# Patient Record
Sex: FEMALE | Race: WHITE | NOT HISPANIC OR LATINO | URBAN - METROPOLITAN AREA
[De-identification: names, ages, dates, MRNs, and addresses within clinical notes are randomized per-mention and may not be internally consistent; named-entity substitution may affect disease eponyms.]

---

## 2017-01-03 ENCOUNTER — APPOINTMENT (RX ONLY)
Dept: URBAN - METROPOLITAN AREA CLINIC 22 | Facility: CLINIC | Age: 52
Setting detail: DERMATOLOGY
End: 2017-01-03

## 2017-01-03 DIAGNOSIS — Z42.8 ENCOUNTER FOR OTHER PLASTIC AND RECONSTRUCTIVE SURGERY FOLLOWING MEDICAL PROCEDURE OR HEALED INJURY: ICD-10-CM

## 2017-01-03 PROCEDURE — ? COUNSELING - POST-OP CHECK, IMPLANT EXCHANGE WITH MASTOPEXY

## 2017-01-03 PROCEDURE — 99024 POSTOP FOLLOW-UP VISIT: CPT

## 2017-01-03 ASSESSMENT — LOCATION SIMPLE DESCRIPTION DERM
LOCATION SIMPLE: LEFT BREAST
LOCATION SIMPLE: RIGHT BREAST

## 2017-01-03 ASSESSMENT — LOCATION ZONE DERM: LOCATION ZONE: TRUNK

## 2017-01-03 ASSESSMENT — LOCATION DETAILED DESCRIPTION DERM
LOCATION DETAILED: LEFT MEDIAL BREAST 8-9:00 REGION
LOCATION DETAILED: RIGHT MEDIAL BREAST 5-6:00 REGION

## 2017-01-03 NOTE — HPI: POST-OP CHECK, COSMETIC (EXTENDED)
What Best Describes The Level Of Symmetry? (Check All That Apply): good
How Severe Is Your Pain?: mild
How Is Your Wound Healing?: healing well
Compared To The Last Evaluation, How Have The Findings Changed?: improved
Date Of Procedure: 10/31/2016

## 2017-01-25 ENCOUNTER — RX ONLY (OUTPATIENT)
Age: 52
Setting detail: RX ONLY
End: 2017-01-25

## 2017-01-25 RX ORDER — OXYCODONE HYDROCHLORIDE AND ACETAMINOPHEN 5; 325 MG/1; MG/1
TABLET ORAL
Qty: 40 | Refills: 0

## 2017-01-30 ENCOUNTER — APPOINTMENT (RX ONLY)
Dept: URBAN - METROPOLITAN AREA CLINIC 22 | Facility: CLINIC | Age: 52
Setting detail: DERMATOLOGY
End: 2017-01-30

## 2017-01-30 DIAGNOSIS — H02.83 DERMATOCHALASIS OF EYELID: ICD-10-CM

## 2017-01-30 PROBLEM — H02.834 DERMATOCHALASIS OF LEFT UPPER EYELID: Status: ACTIVE | Noted: 2017-01-30

## 2017-01-30 PROBLEM — H02.831 DERMATOCHALASIS OF RIGHT UPPER EYELID: Status: ACTIVE | Noted: 2017-01-30

## 2017-01-30 PROCEDURE — ? PRESCRIPTION

## 2017-01-30 PROCEDURE — 99211 OFF/OP EST MAY X REQ PHY/QHP: CPT

## 2017-01-30 PROCEDURE — ? PRE-OP WORKLIST

## 2017-01-30 RX ORDER — CEPHALEXIN 500 MG/1
CAPSULE ORAL
Qty: 28 | Refills: 0 | Status: ERX

## 2017-01-30 RX ORDER — ONDANSETRON HYDROCHLORIDE 4 MG/1
TABLET, ORALLY DISINTEGRATING ORAL
Qty: 15 | Refills: 0 | Status: ERX

## 2017-01-30 ASSESSMENT — LOCATION ZONE DERM: LOCATION ZONE: EYELID

## 2017-01-30 ASSESSMENT — LOCATION DETAILED DESCRIPTION DERM
LOCATION DETAILED: LEFT LATERAL SUPERIOR EYELID
LOCATION DETAILED: RIGHT SUPRATARSAL CREASE

## 2017-01-30 ASSESSMENT — LOCATION SIMPLE DESCRIPTION DERM
LOCATION SIMPLE: RIGHT SUPERIOR EYELID
LOCATION SIMPLE: LEFT SUPERIOR EYELID

## 2017-01-30 NOTE — HPI: PREOPERATIVE APPOINTMENT (BRIEF)
Has The Patient Completed Informed Consent?: is scheduled to complete informed consent documentation during this visit
Have Arrangements And Instructions Been Made For Patient Transportation?: Transportation arrangements have been made
Date Of Procedure?: 02/06/2017
Facility: [facility]
Surgeon: Dr. Byers

## 2017-01-30 NOTE — PROCEDURE: PRE-OP WORKLIST
Surgeon: Dr. Byers
Photos Taken?: yes
Date Of Surgery: 02/06/2017
Surgery Scheduled: bilateral upper blepharoplasty (insurance)
Detail Level: Simple
Admission Status: outpatient

## 2017-02-06 ENCOUNTER — APPOINTMENT (RX ONLY)
Dept: URBAN - METROPOLITAN AREA SURGERY CENTER 5 | Facility: SURGERY CENTER | Age: 52
Setting detail: DERMATOLOGY
End: 2017-02-06

## 2017-02-06 DIAGNOSIS — H02.83 DERMATOCHALASIS OF EYELID: ICD-10-CM

## 2017-02-06 PROBLEM — H02.831 DERMATOCHALASIS OF RIGHT UPPER EYELID: Status: ACTIVE | Noted: 2017-02-06

## 2017-02-06 PROBLEM — H02.834 DERMATOCHALASIS OF LEFT UPPER EYELID: Status: ACTIVE | Noted: 2017-02-06

## 2017-02-06 PROCEDURE — ? SET GLOBAL PERIOD

## 2017-02-06 PROCEDURE — 15823 BLEPHARP UPR EYELID XCSV SKN: CPT | Mod: 50

## 2017-02-06 ASSESSMENT — LOCATION SIMPLE DESCRIPTION DERM
LOCATION SIMPLE: RIGHT SUPERIOR EYELID
LOCATION SIMPLE: RIGHT SUPERIOR EYELID
LOCATION SIMPLE: LEFT SUPERIOR EYELID
LOCATION SIMPLE: LEFT SUPERIOR EYELID

## 2017-02-06 ASSESSMENT — LOCATION DETAILED DESCRIPTION DERM
LOCATION DETAILED: RIGHT LATERAL SUPERIOR EYELID
LOCATION DETAILED: LEFT LATERAL SUPERIOR EYELID
LOCATION DETAILED: LEFT LATERAL SUPERIOR EYELID
LOCATION DETAILED: RIGHT SUPRATARSAL CREASE

## 2017-02-06 ASSESSMENT — LOCATION ZONE DERM
LOCATION ZONE: EYELID
LOCATION ZONE: EYELID

## 2017-02-06 NOTE — PROCEDURE: SET GLOBAL PERIOD
Date Of Surgery (Mm/Dd/Yyyy): 02/06/2017
Detail Level: Detailed
Other Surgery Performed: upperlid blepharoplasty
Surgery Global Period: 90

## 2017-02-13 ENCOUNTER — APPOINTMENT (RX ONLY)
Dept: URBAN - METROPOLITAN AREA CLINIC 22 | Facility: CLINIC | Age: 52
Setting detail: DERMATOLOGY
End: 2017-02-13

## 2017-02-13 DIAGNOSIS — Z48.89 ENCOUNTER FOR OTHER SPECIFIED SURGICAL AFTERCARE: ICD-10-CM

## 2017-02-13 PROCEDURE — ? PATIENT SPECIFIC COUNSELING

## 2017-02-13 PROCEDURE — 99024 POSTOP FOLLOW-UP VISIT: CPT

## 2017-02-13 PROCEDURE — ? SUTURE REMOVAL

## 2017-02-13 PROCEDURE — ? COUNSELING - POST-OP CHECK, BLEPHAROPLASTY

## 2017-02-13 ASSESSMENT — LOCATION SIMPLE DESCRIPTION DERM
LOCATION SIMPLE: LEFT SUPERIOR EYELID
LOCATION SIMPLE: RIGHT SUPERIOR EYELID

## 2017-02-13 ASSESSMENT — LOCATION DETAILED DESCRIPTION DERM
LOCATION DETAILED: LEFT LATERAL SUPERIOR EYELID
LOCATION DETAILED: RIGHT LATERAL SUPERIOR EYELID

## 2017-02-13 ASSESSMENT — LOCATION ZONE DERM: LOCATION ZONE: EYELID

## 2017-02-13 NOTE — PROCEDURE: PATIENT SPECIFIC COUNSELING
Other (Free Text): Physician counseled the patient to wait one more week before resuming normal workout regimen.
Detail Level: Simple

## 2017-02-13 NOTE — PROCEDURE: REASSURANCE
Information Provided: Reassurance was provided to the patient that the clinical findings are within expected normal limits and do not require specific further evaluation or treatment at this time.
Follow-Up Increment: 1
Follow-Up Interval: month
Follow-Up: no
Detail Level: Simple

## 2017-02-13 NOTE — HPI: POST-OP CHECK (EXTENDED)
How Severe Is Your Pain?: 0 out of 10
How Is Your Wound Healing?: healing well
Date Of Procedure: 2/6/2017

## 2017-05-03 ENCOUNTER — APPOINTMENT (RX ONLY)
Dept: URBAN - METROPOLITAN AREA CLINIC 22 | Facility: CLINIC | Age: 52
Setting detail: DERMATOLOGY
End: 2017-05-03

## 2017-05-03 DIAGNOSIS — Z41.1 ENCOUNTER FOR COSMETIC SURGERY: ICD-10-CM

## 2017-05-03 DIAGNOSIS — Z48.89 ENCOUNTER FOR OTHER SPECIFIED SURGICAL AFTERCARE: ICD-10-CM

## 2017-05-03 DIAGNOSIS — H57.81 BROW PTOSIS: ICD-10-CM

## 2017-05-03 DIAGNOSIS — Z42.8 ENCOUNTER FOR OTHER PLASTIC AND RECONSTRUCTIVE SURGERY FOLLOWING MEDICAL PROCEDURE OR HEALED INJURY: ICD-10-CM

## 2017-05-03 PROCEDURE — 99024 POSTOP FOLLOW-UP VISIT: CPT

## 2017-05-03 PROCEDURE — ? COUNSELING - POST-OP CHECK, IMPLANT EXCHANGE WITH MASTOPEXY

## 2017-05-03 PROCEDURE — ? CONSULTATION - BROW PTOSIS

## 2017-05-03 PROCEDURE — ? COUNSELING - POST-OP CHECK, BLEPHAROPLASTY

## 2017-05-03 ASSESSMENT — LOCATION ZONE DERM
LOCATION ZONE: TRUNK
LOCATION ZONE: EYELID
LOCATION ZONE: FACE

## 2017-05-03 ASSESSMENT — LOCATION DETAILED DESCRIPTION DERM
LOCATION DETAILED: LEFT MEDIAL BREAST 8-9:00 REGION
LOCATION DETAILED: LEFT CENTRAL EYEBROW
LOCATION DETAILED: LEFT LATERAL SUPERIOR EYELID
LOCATION DETAILED: RIGHT LATERAL SUPERIOR EYELID
LOCATION DETAILED: RIGHT CENTRAL EYEBROW
LOCATION DETAILED: RIGHT MEDIAL BREAST 5-6:00 REGION

## 2017-05-03 ASSESSMENT — LOCATION SIMPLE DESCRIPTION DERM
LOCATION SIMPLE: LEFT SUPERIOR EYELID
LOCATION SIMPLE: RIGHT SUPERIOR EYELID
LOCATION SIMPLE: LEFT BREAST
LOCATION SIMPLE: LEFT EYEBROW
LOCATION SIMPLE: RIGHT EYEBROW
LOCATION SIMPLE: RIGHT BREAST

## 2017-05-03 NOTE — HPI: POST-OP CHECK, COSMETIC (EXTENDED)
What Best Describes The Level Of Symmetry? (Check All That Apply): good
How Severe Is Your Pain?: 0 out of 10
How Is Your Wound Healing?: healing well
Compared To The Last Evaluation, How Have The Findings Changed?: improved
Date Of Procedure: 10/2016 2/06/2017